# Patient Record
Sex: FEMALE | HISPANIC OR LATINO
[De-identification: names, ages, dates, MRNs, and addresses within clinical notes are randomized per-mention and may not be internally consistent; named-entity substitution may affect disease eponyms.]

---

## 2018-09-05 PROBLEM — Z00.00 ENCOUNTER FOR PREVENTIVE HEALTH EXAMINATION: Status: ACTIVE | Noted: 2018-09-05

## 2018-09-12 ENCOUNTER — RX RENEWAL (OUTPATIENT)
Age: 58
End: 2018-09-12

## 2018-10-05 ENCOUNTER — APPOINTMENT (OUTPATIENT)
Dept: ENDOCRINOLOGY | Facility: CLINIC | Age: 58
End: 2018-10-05

## 2018-10-08 ENCOUNTER — RX RENEWAL (OUTPATIENT)
Age: 58
End: 2018-10-08

## 2018-10-09 ENCOUNTER — RX RENEWAL (OUTPATIENT)
Age: 58
End: 2018-10-09

## 2018-11-19 ENCOUNTER — RX RENEWAL (OUTPATIENT)
Age: 58
End: 2018-11-19

## 2018-12-20 ENCOUNTER — RX RENEWAL (OUTPATIENT)
Age: 58
End: 2018-12-20

## 2019-01-08 ENCOUNTER — RX RENEWAL (OUTPATIENT)
Age: 59
End: 2019-01-08

## 2019-01-11 ENCOUNTER — RECORD ABSTRACTING (OUTPATIENT)
Age: 59
End: 2019-01-11

## 2019-01-11 DIAGNOSIS — Z86.39 PERSONAL HISTORY OF OTHER ENDOCRINE, NUTRITIONAL AND METABOLIC DISEASE: ICD-10-CM

## 2019-01-21 ENCOUNTER — APPOINTMENT (OUTPATIENT)
Dept: ENDOCRINOLOGY | Facility: CLINIC | Age: 59
End: 2019-01-21
Payer: MEDICAID

## 2019-01-21 ENCOUNTER — RX RENEWAL (OUTPATIENT)
Age: 59
End: 2019-01-21

## 2019-01-21 VITALS
WEIGHT: 171 LBS | DIASTOLIC BLOOD PRESSURE: 84 MMHG | SYSTOLIC BLOOD PRESSURE: 122 MMHG | BODY MASS INDEX: 32.28 KG/M2 | HEIGHT: 61 IN | HEART RATE: 95 BPM

## 2019-01-21 DIAGNOSIS — Z63.4 DISAPPEARANCE AND DEATH OF FAMILY MEMBER: ICD-10-CM

## 2019-01-21 DIAGNOSIS — Z83.3 FAMILY HISTORY OF DIABETES MELLITUS: ICD-10-CM

## 2019-01-21 DIAGNOSIS — Z78.9 OTHER SPECIFIED HEALTH STATUS: ICD-10-CM

## 2019-01-21 DIAGNOSIS — Z82.49 FAMILY HISTORY OF ISCHEMIC HEART DISEASE AND OTHER DISEASES OF THE CIRCULATORY SYSTEM: ICD-10-CM

## 2019-01-21 LAB
GLUCOSE BLDC GLUCOMTR-MCNC: 118
GLUCOSE SERPL-MCNC: 69
HBA1C MFR BLD HPLC: 6.9
LDLC SERPL DIRECT ASSAY-MCNC: 94

## 2019-01-21 PROCEDURE — 99214 OFFICE O/P EST MOD 30 MIN: CPT | Mod: 25

## 2019-01-21 PROCEDURE — 82962 GLUCOSE BLOOD TEST: CPT

## 2019-01-21 RX ORDER — DULAGLUTIDE 1.5 MG/.5ML
1.5 INJECTION, SOLUTION SUBCUTANEOUS
Refills: 0 | Status: DISCONTINUED | COMMUNITY
End: 2019-01-21

## 2019-01-21 RX ORDER — DULAGLUTIDE 1.5 MG/.5ML
1.5 INJECTION, SOLUTION SUBCUTANEOUS
Qty: 12 | Refills: 0 | Status: DISCONTINUED | COMMUNITY
Start: 2018-09-12 | End: 2019-01-21

## 2019-01-21 SDOH — SOCIAL STABILITY - SOCIAL INSECURITY: DISSAPEARANCE AND DEATH OF FAMILY MEMBER: Z63.4

## 2019-01-21 NOTE — ASSESSMENT
[FreeTextEntry1] : 58 year old female with type 2 DM with associated neuropathy, HTN, Dyslipidemia and vitamin D insufficiency.  her glycemic control has improved. \par \par 1.  Type 2 DM-  due to abdominal symptoms and improved control, will lower dose of Trulicity to 0.75 mg qweek. \par Ok to proceed with Vocal cord surgery.\par 2.  HTN- well controlled, continue current Rx\par 3.  Dyslipidemia-  immproved, continue to monitor\par 4.  Vitamin D insufficiency-  start vitamin D3 1000 IU daily.

## 2019-01-21 NOTE — REVIEW OF SYSTEMS
[Abdominal Pain] : abdominal pain [Recent Weight Gain (___ Lbs)] : no recent weight gain [Recent Weight Loss (___ Lbs)] : no recent weight loss [Chest Pain] : no chest pain [Shortness Of Breath] : no shortness of breath [Nausea] : no nausea [Polyuria] : no polyuria [Polydipsia] : no polydipsia

## 2019-01-21 NOTE — PHYSICAL EXAM
[No Acute Distress] : no acute distress [Well Nourished] : well nourished [Well Developed] : well developed [Normal Sclera/Conjunctiva] : normal sclera/conjunctiva [No Proptosis] : no proptosis [No Neck Mass] : no neck mass was observed [No LAD] : no lymphadenopathy [Thyroid Not Enlarged] : the thyroid was not enlarged [No Thyroid Nodules] : there were no palpable thyroid nodules [Normal Rate and Effort] : normal respiratory rhythm and effort [Clear to Auscultation] : lungs were clear to auscultation bilaterally [Normal Rate] : heart rate was normal  [Normal S1, S2] : normal S1 and S2 [Regular Rhythm] : with a regular rhythm [Murmurs] : no murmurs [No Edema] : there was no peripheral edema [Acanthosis Nigricans] : acanthosis nigricans [Normal Insight/Judgement] : insight and judgment were intact [Normal Affect] : the affect was normal [Normal Mood] : the mood was normal

## 2019-01-21 NOTE — HISTORY OF PRESENT ILLNESS
[FreeTextEntry1] : Patient is seen today for a routine diabetic follow up.  Also with HTN and hyperlipidemia\par Quality:  Type 2 DM\par Severity:  uncontrolled\par Duration of diabetes:  since 2005\par Associated Complications/ Symptoms:  neuropathy\par Modifying Factors:  Better with medication\par \par Patient tests blood glucose 1 times per day.  \par Reports most fasting BG in the 70- 90 range.  \par \par Current Diabetic Medication Regimen:\par Metformin ER 1000 mg BID\par Glipizide ER 10 mg BID\par Jardiance 25 mg daily (BG was less controlled on Farxiga in the past)\par Trulicity 1.5 mg qweek\par \par Insurance does not cover Sylvie Pro.  \par \par Does have some mild discomfort in abdomen sometimes after taking Trulicity.  no nausea.   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

## 2019-01-21 NOTE — CONSULT LETTER
[Dear  ___] : Dear  [unfilled], [Courtesy Letter:] : I had the pleasure of seeing your patient, [unfilled], in my office today. [Please see my note below.] : Please see my note below. [Consult Closing:] : Thank you very much for allowing me to participate in the care of this patient.  If you have any questions, please do not hesitate to contact me. [Sincerely,] : Sincerely, [FreeTextEntry3] : Isacc Cintron MD, FACE\par

## 2019-03-15 ENCOUNTER — RX RENEWAL (OUTPATIENT)
Age: 59
End: 2019-03-15

## 2019-03-25 ENCOUNTER — MEDICATION RENEWAL (OUTPATIENT)
Age: 59
End: 2019-03-25

## 2019-03-25 ENCOUNTER — RX RENEWAL (OUTPATIENT)
Age: 59
End: 2019-03-25

## 2019-03-25 RX ORDER — METFORMIN HYDROCHLORIDE 1000 MG/1
1000 TABLET, EXTENDED RELEASE ORAL
Qty: 180 | Refills: 0 | Status: DISCONTINUED | COMMUNITY
Start: 2018-11-19 | End: 2019-03-25

## 2019-06-24 ENCOUNTER — APPOINTMENT (OUTPATIENT)
Dept: ENDOCRINOLOGY | Facility: CLINIC | Age: 59
End: 2019-06-24
Payer: MEDICAID

## 2019-06-24 VITALS
HEART RATE: 94 BPM | BODY MASS INDEX: 32.47 KG/M2 | WEIGHT: 172 LBS | DIASTOLIC BLOOD PRESSURE: 82 MMHG | HEIGHT: 61 IN | SYSTOLIC BLOOD PRESSURE: 120 MMHG

## 2019-06-24 LAB
GLUCOSE BLDC GLUCOMTR-MCNC: 119
HBA1C MFR BLD HPLC: 7.1
LDLC SERPL DIRECT ASSAY-MCNC: 95
MICROALBUMIN/CREAT 24H UR-RTO: <18.8

## 2019-06-24 PROCEDURE — 82962 GLUCOSE BLOOD TEST: CPT

## 2019-06-24 PROCEDURE — 99214 OFFICE O/P EST MOD 30 MIN: CPT | Mod: 25

## 2019-06-24 RX ORDER — METFORMIN ER 500 MG 500 MG/1
500 TABLET ORAL
Qty: 360 | Refills: 1 | Status: DISCONTINUED | COMMUNITY
Start: 2019-03-25 | End: 2019-06-24

## 2019-06-24 NOTE — ASSESSMENT
[FreeTextEntry1] : 59 year old female with type 2 DM with associated neuropathy, HTN, Dyslipidemia and vitamin D insufficiency.  Her Glycemic control is reasonable at this time.  Safe to proceed with planned neck surgery from diabetic perspective.  \par \par 1.  Type 2 DM-  continue current Rx.\par 2.  HTN- well controlled, continue current Rx.\par 3.  Dyslipidemia-  improved, continue to monitor.\par 4.  Vitamin D insufficiency-  improved, continue vitamin D3 1000 IU daily

## 2019-06-24 NOTE — CONSULT LETTER
[Dear  ___] : Dear ~KEON, [Courtesy Letter:] : I had the pleasure of seeing your patient, [unfilled], in my office today. [Please see my note below.] : Please see my note below. [Consult Closing:] : Thank you very much for allowing me to participate in the care of this patient.  If you have any questions, please do not hesitate to contact me. [Sincerely,] : Sincerely, [FreeTextEntry3] : Isacc Cintron MD, FACE\par

## 2019-06-24 NOTE — REVIEW OF SYSTEMS
[Back Pain] : back pain [Recent Weight Gain (___ Lbs)] : no recent weight gain [Chest Pain] : no chest pain [Shortness Of Breath] : no shortness of breath [Nausea] : no nausea [Abdominal Pain] : no abdominal pain [Polyuria] : no polyuria

## 2019-06-24 NOTE — HISTORY OF PRESENT ILLNESS
[FreeTextEntry1] : Patient is seen today for a routine diabetic follow up.  Also with HTN and hyperlipidemia\par Quality:  Type 2 DM\par Severity:  uncontrolled\par Duration of diabetes:  since 2005\par Associated Complications/ Symptoms:  neuropathy\par Modifying Factors:  Better with medication\par \par Patient tests blood glucose 1 times per day.   Reports that fasting BG in the 90- 120 mg/dl range with no recent hypoglycemia.  \par \par Current Diabetic Medication Regimen:\par Metformin 1000 mg BID\par Glipizide ER 10 mg BID\par Jardiance 25 mg daily (BG was less controlled on Farxiga in the past)\par Trulicity 0.75 mg qweek  (dose lowered last visit due to abdominal discomfort after injection)\par \par Insurance does not cover Sylvie Pro.  \par Going ofr neck surgery in July and needs clearance.  \par

## 2019-06-24 NOTE — DATA REVIEWED
[FreeTextEntry1] : LABS:\par 6/20/2019:\par A1c 7.1%\par LDL 95\par Urine microalbumin/ Creatinine:  < 18.8\par 25(OH)D  59

## 2019-07-25 ENCOUNTER — APPOINTMENT (OUTPATIENT)
Dept: ENDOCRINOLOGY | Facility: CLINIC | Age: 59
End: 2019-07-25

## 2019-07-30 ENCOUNTER — APPOINTMENT (OUTPATIENT)
Dept: ENDOCRINOLOGY | Facility: CLINIC | Age: 59
End: 2019-07-30
Payer: MEDICAID

## 2019-07-30 VITALS
OXYGEN SATURATION: 93 % | SYSTOLIC BLOOD PRESSURE: 118 MMHG | WEIGHT: 175 LBS | HEART RATE: 105 BPM | HEIGHT: 61 IN | BODY MASS INDEX: 33.04 KG/M2 | DIASTOLIC BLOOD PRESSURE: 76 MMHG

## 2019-07-30 LAB — GLUCOSE BLDC GLUCOMTR-MCNC: 157

## 2019-07-30 PROCEDURE — 82962 GLUCOSE BLOOD TEST: CPT

## 2019-07-30 PROCEDURE — 99214 OFFICE O/P EST MOD 30 MIN: CPT | Mod: 25

## 2019-07-30 RX ORDER — FAMOTIDINE 20 MG/1
20 TABLET, FILM COATED ORAL DAILY
Qty: 90 | Refills: 0 | Status: DISCONTINUED | COMMUNITY
End: 2019-07-30

## 2019-07-30 RX ORDER — GABAPENTIN 300 MG/1
300 CAPSULE ORAL
Qty: 30 | Refills: 0 | Status: DISCONTINUED | COMMUNITY
Start: 2019-03-04 | End: 2019-07-30

## 2019-07-30 NOTE — HISTORY OF PRESENT ILLNESS
[FreeTextEntry1] : Patient is seen today for a routine diabetic follow up.  Also with HTN and hyperlipidemia\par Quality:  Type 2 DM\par Severity:  uncontrolled\par Duration of diabetes:  since 2005\par Associated Complications/ Symptoms:  neuropathy\par Modifying Factors:  Better with medication\par \par Patient tests blood glucose 1-2 times per day.     Insurance does not cover Sylvie Pro.   Reports that BG has been well controlled with moist values in the in the low 100s.  No recent hypoglycemia.  \par \par Current Diabetic Medication Regimen:\par Metformin 1000 mg BID\par Glipizide ER 10 mg BID\par Jardiance 25 mg daily (BG was less controlled on Farxiga in the past)\par Trulicity 0.75 mg qweek  (dose lowered last visit due to abdominal discomfort after injection)\par \par On 8/6/2019 having anterior cervical discectomy with fusion.\par C/o neck pain.

## 2019-07-30 NOTE — ASSESSMENT
[FreeTextEntry1] : 59 year old female with type 2 DM with associated neuropathy, HTN, Dyslipidemia and vitamin D insufficiency.  Her Glycemic control is reasonable at this time.  Safe to proceed with planned cervical fusion from diabetic perspective.  \par \par 1.  Type 2 DM-  continue current Rx, A1c has improved.  \par 2.  HTN- well controlled, continue current Rx.\par 3.  Dyslipidemia-  improved, continue to monitor.\par 4.  Vitamin D insufficiency-   continue vitamin D3 1000 IU daily  No

## 2019-07-30 NOTE — PHYSICAL EXAM
[Well Nourished] : well nourished [No Acute Distress] : no acute distress [Normal Sclera/Conjunctiva] : normal sclera/conjunctiva [Well Developed] : well developed [No Proptosis] : no proptosis [No Neck Mass] : no neck mass was observed [No LAD] : no lymphadenopathy [Thyroid Not Enlarged] : the thyroid was not enlarged [No Thyroid Nodules] : there were no palpable thyroid nodules [No Respiratory Distress] : no respiratory distress [Clear to Auscultation] : lungs were clear to auscultation bilaterally [Normal Rate] : heart rate was normal  [Normal S1, S2] : normal S1 and S2 [Regular Rhythm] : with a regular rhythm [Murmurs] : no murmurs [No Edema] : there was no peripheral edema [Acanthosis Nigricans] : no acanthosis nigricans [Normal Insight/Judgement] : insight and judgment were intact [Normal Affect] : the affect was normal [Normal Mood] : the mood was normal

## 2019-07-30 NOTE — REVIEW OF SYSTEMS
[Recent Weight Gain (___ Lbs)] : no recent weight gain [Chest Pain] : no chest pain [Recent Weight Loss (___ Lbs)] : no recent weight loss [Back Pain] : back pain [Nausea] : no nausea [Shortness Of Breath] : no shortness of breath [Pain/Numbness of Digits] : no pain/numbness of digits

## 2019-09-13 ENCOUNTER — RX RENEWAL (OUTPATIENT)
Age: 59
End: 2019-09-13

## 2019-10-09 ENCOUNTER — RX RENEWAL (OUTPATIENT)
Age: 59
End: 2019-10-09

## 2019-11-11 LAB
HBA1C MFR BLD HPLC: 6.8
LDLC SERPL DIRECT ASSAY-MCNC: 115

## 2019-11-12 ENCOUNTER — APPOINTMENT (OUTPATIENT)
Dept: ENDOCRINOLOGY | Facility: CLINIC | Age: 59
End: 2019-11-12
Payer: MEDICAID

## 2019-11-12 VITALS
SYSTOLIC BLOOD PRESSURE: 108 MMHG | HEIGHT: 61 IN | DIASTOLIC BLOOD PRESSURE: 74 MMHG | OXYGEN SATURATION: 96 % | HEART RATE: 107 BPM | WEIGHT: 173 LBS | BODY MASS INDEX: 32.66 KG/M2

## 2019-11-12 DIAGNOSIS — E55.9 VITAMIN D DEFICIENCY, UNSPECIFIED: ICD-10-CM

## 2019-11-12 DIAGNOSIS — M50.10 CERVICAL DISC DISORDER WITH RADICULOPATHY, UNSPECIFIED CERVICAL REGION: ICD-10-CM

## 2019-11-12 LAB — GLUCOSE BLDC GLUCOMTR-MCNC: 149

## 2019-11-12 PROCEDURE — 99214 OFFICE O/P EST MOD 30 MIN: CPT | Mod: 25

## 2019-11-12 PROCEDURE — 82962 GLUCOSE BLOOD TEST: CPT

## 2019-11-12 NOTE — PHYSICAL EXAM
[No Acute Distress] : no acute distress [Well Nourished] : well nourished [Well Developed] : well developed [No Proptosis] : no proptosis [Normal Sclera/Conjunctiva] : normal sclera/conjunctiva [No LAD] : no lymphadenopathy [No Neck Mass] : no neck mass was observed [Thyroid Not Enlarged] : the thyroid was not enlarged [No Respiratory Distress] : no respiratory distress [Clear to Auscultation] : lungs were clear to auscultation bilaterally [No Thyroid Nodules] : there were no palpable thyroid nodules [Normal Rate] : heart rate was normal  [Normal S1, S2] : normal S1 and S2 [Regular Rhythm] : with a regular rhythm [Murmurs] : no murmurs [No Edema] : there was no peripheral edema [Right Foot Was Examined] : right foot ~C was examined [Left Foot Was Examined] : left foot ~C was examined [Normal] : normal [2+] : 2+ in the dorsalis pedis [Normal Insight/Judgement] : insight and judgment were intact [Normal Affect] : the affect was normal [Normal Mood] : the mood was normal [Acanthosis Nigricans] : no acanthosis nigricans [Diminished Throughout Both Feet] : normal tactile sensation with monofilament testing throughout both feet

## 2019-11-12 NOTE — ASSESSMENT
[FreeTextEntry1] : 59 year old female with type 2 DM with associated neuropathy, HTN, Dyslipidemia and vitamin D insufficiency.  Her glycemic control is excellent.  \par \par 1.  Type 2 DM-  continue current Rx .  \par 2.  HTN- well controlled on current Rx.\par 3.  Dyslipidemia- Would benefit from addition of statin.   Will start Atorvastatin 10 mg daily.  \par 4.  Vitamin D insufficiency-   continue vitamin D3 1000 IU daily

## 2019-11-12 NOTE — HISTORY OF PRESENT ILLNESS
[FreeTextEntry1] : Patient is seen today for a routine diabetic follow up.  Also with HTN and hyperlipidemia\par Quality:  Type 2 DM\par Severity:  uncontrolled\par Duration of diabetes:  since 2005\par Associated Complications/ Symptoms:  neuropathy\par Modifying Factors:  Better with medication\par \par Patient tests blood glucose 1-2 times per day.     Insurance does not cover Sylvie Pro.   \par Most BG in the 70- 140 mg/dl range when fasting.  No recent hypoglycemia.  \par \par Current Diabetic Medication Regimen:\par Metformin 1000 mg BID\par Glipizide ER 10 mg BID\par Jardiance 25 mg daily (BG was less controlled on Farxiga in the past)\par Trulicity 0.75 mg qweek  (dose lowered last visit due to abdominal discomfort after injection)\par \par

## 2020-03-03 ENCOUNTER — RX RENEWAL (OUTPATIENT)
Age: 60
End: 2020-03-03

## 2020-04-27 ENCOUNTER — APPOINTMENT (OUTPATIENT)
Dept: ENDOCRINOLOGY | Facility: CLINIC | Age: 60
End: 2020-04-27

## 2020-05-06 ENCOUNTER — RX RENEWAL (OUTPATIENT)
Age: 60
End: 2020-05-06

## 2020-05-07 ENCOUNTER — APPOINTMENT (OUTPATIENT)
Dept: ENDOCRINOLOGY | Facility: CLINIC | Age: 60
End: 2020-05-07

## 2020-05-20 ENCOUNTER — RX RENEWAL (OUTPATIENT)
Age: 60
End: 2020-05-20

## 2020-09-08 ENCOUNTER — APPOINTMENT (OUTPATIENT)
Dept: ENDOCRINOLOGY | Facility: CLINIC | Age: 60
End: 2020-09-08
Payer: MEDICAID

## 2020-09-08 VITALS
DIASTOLIC BLOOD PRESSURE: 78 MMHG | OXYGEN SATURATION: 93 % | HEIGHT: 60 IN | HEART RATE: 110 BPM | BODY MASS INDEX: 34.16 KG/M2 | SYSTOLIC BLOOD PRESSURE: 108 MMHG | WEIGHT: 174 LBS

## 2020-09-08 DIAGNOSIS — Z87.19 PERSONAL HISTORY OF OTHER DISEASES OF THE DIGESTIVE SYSTEM: ICD-10-CM

## 2020-09-08 LAB — GLUCOSE BLDC GLUCOMTR-MCNC: 145

## 2020-09-08 PROCEDURE — 82962 GLUCOSE BLOOD TEST: CPT

## 2020-09-08 PROCEDURE — 99214 OFFICE O/P EST MOD 30 MIN: CPT | Mod: 25

## 2020-09-08 RX ORDER — OMEPRAZOLE 20 MG/1
20 CAPSULE, DELAYED RELEASE ORAL
Qty: 30 | Refills: 0 | Status: DISCONTINUED | COMMUNITY
Start: 2019-01-16 | End: 2020-09-08

## 2020-09-08 NOTE — HISTORY OF PRESENT ILLNESS
[FreeTextEntry1] : Patient is seen today for a routine diabetic follow up.  Also with HTN and hyperlipidemia\par Quality:  Type 2 DM\par Severity:  uncontrolled\par Duration of diabetes:  since 2005\par Associated Complications/ Symptoms:  neuropathy\par Modifying Factors:  Better with medication\par \par Patient tests blood glucose 1-2 times per day.     Insurance does not cover Sylvie Pro.   \par \par Current Diabetic Medication Regimen:\par Metformin 1000 mg BID\par Glipizide ER 10 mg BID\par Jardiance 25 mg daily (BG was less controlled on Farxiga in the past)\par Trulicity 0.75 mg qweek  (dose lowered last visit due to abdominal discomfort after injection)\par \par Requesting refill on omeprazole.  If she does not take this she reports GERD.  No recent abdominal pain or nausea.  \par

## 2020-09-08 NOTE — ASSESSMENT
[FreeTextEntry1] : 60 year old female with type 2 DM with associated neuropathy, HTN, Dyslipidemia and vitamin D insufficiency.  Her glycemic control is excellent.  \par \par 1.  Type 2 DM-  continue current Rx .  \par 2.  HTN-  continue Losartan\par 3.  Dyslipidemia- not taking statin due to elevated LFTs (likely secondary to NAFLD).  Continue to monitor.  \par 4.  GERD-  will give one time refill on omeprazole until she can follow up with PCP for further refills.

## 2020-09-08 NOTE — PHYSICAL EXAM
[Healthy Appearance] : healthy appearance [Normal Sclera/Conjunctiva] : normal sclera/conjunctiva [No Acute Distress] : no acute distress [No Proptosis] : no proptosis [No Neck Mass] : no neck mass was observed [Supple] : the neck was supple [No LAD] : no lymphadenopathy [Thyroid Not Enlarged] : the thyroid was not enlarged [Clear to Auscultation] : lungs were clear to auscultation bilaterally [No Respiratory Distress] : no respiratory distress [No Thyroid Nodules] : no palpable thyroid nodules [Normal S1, S2] : normal S1 and S2 [Normal Rate] : heart rate was normal [Regular Rhythm] : with a regular rhythm [No Murmurs] : no murmurs [Normal Affect] : the affect was normal [Normal Insight/Judgement] : insight and judgment were intact [Normal Mood] : the mood was normal [Acanthosis Nigricans] : no acanthosis nigricans

## 2020-09-08 NOTE — DATA REVIEWED
[FreeTextEntry1] : LABS:\par 9/3/2020:\par A1c 6.9%\par AST  46\par ALT 70\par Alb/ Creatinine:  <6\par \par

## 2020-09-08 NOTE — REVIEW OF SYSTEMS
[Heartburn] : heartburn [Chest Pain] : no chest pain [Shortness Of Breath] : no shortness of breath [Nausea] : no nausea

## 2020-09-08 NOTE — CONSULT LETTER
[Dear  ___] : Dear  [unfilled], [Courtesy Letter:] : I had the pleasure of seeing your patient, [unfilled], in my office today. [Consult Closing:] : Thank you very much for allowing me to participate in the care of this patient.  If you have any questions, please do not hesitate to contact me. [Sincerely,] : Sincerely, [FreeTextEntry3] : Isacc Cintron MD, FACE\par

## 2020-10-14 ENCOUNTER — RX RENEWAL (OUTPATIENT)
Age: 60
End: 2020-10-14

## 2020-12-01 ENCOUNTER — RX RENEWAL (OUTPATIENT)
Age: 60
End: 2020-12-01

## 2021-01-04 ENCOUNTER — RX RENEWAL (OUTPATIENT)
Age: 61
End: 2021-01-04

## 2021-01-11 ENCOUNTER — APPOINTMENT (OUTPATIENT)
Dept: ENDOCRINOLOGY | Facility: CLINIC | Age: 61
End: 2021-01-11

## 2021-01-18 ENCOUNTER — RX RENEWAL (OUTPATIENT)
Age: 61
End: 2021-01-18

## 2021-01-29 ENCOUNTER — RX RENEWAL (OUTPATIENT)
Age: 61
End: 2021-01-29

## 2021-02-17 ENCOUNTER — RX RENEWAL (OUTPATIENT)
Age: 61
End: 2021-02-17

## 2021-02-24 ENCOUNTER — RX RENEWAL (OUTPATIENT)
Age: 61
End: 2021-02-24

## 2021-03-01 ENCOUNTER — RX RENEWAL (OUTPATIENT)
Age: 61
End: 2021-03-01

## 2021-09-10 LAB
HBA1C MFR BLD HPLC: 7.6
LDLC SERPL DIRECT ASSAY-MCNC: 116

## 2021-09-13 ENCOUNTER — APPOINTMENT (OUTPATIENT)
Dept: ENDOCRINOLOGY | Facility: CLINIC | Age: 61
End: 2021-09-13
Payer: MEDICAID

## 2021-09-13 VITALS
WEIGHT: 176 LBS | HEIGHT: 60 IN | SYSTOLIC BLOOD PRESSURE: 110 MMHG | HEART RATE: 108 BPM | DIASTOLIC BLOOD PRESSURE: 78 MMHG | BODY MASS INDEX: 34.55 KG/M2 | OXYGEN SATURATION: 97 %

## 2021-09-13 LAB — GLUCOSE BLDC GLUCOMTR-MCNC: 136

## 2021-09-13 PROCEDURE — 99214 OFFICE O/P EST MOD 30 MIN: CPT | Mod: 25

## 2021-09-13 PROCEDURE — 82962 GLUCOSE BLOOD TEST: CPT

## 2021-09-13 RX ORDER — ATORVASTATIN CALCIUM 10 MG/1
10 TABLET, FILM COATED ORAL
Qty: 90 | Refills: 0 | Status: DISCONTINUED | COMMUNITY
Start: 2019-11-12 | End: 2021-09-13

## 2021-09-13 NOTE — HISTORY OF PRESENT ILLNESS
[FreeTextEntry1] : Follow up T2 DM, HTN and hyperlipidemia\par \par Quality:  Type 2 DM\par Severity:  uncontrolled\par Duration of diabetes:  since 2005\par Associated Complications/ Symptoms:  neuropathy\par Modifying Factors:  Better with medication\par \par Patient tests blood glucose 1-2 times per day.   Most BG in the 100- 150 mg/dl range.    \par Insurance does not cover Sylvie Pro.   \par \par Current Diabetic Medication Regimen:\par Metformin 1000 mg BID\par Glipizide ER 10 mg BID\par Jardiance 25 mg daily (BG was less controlled on Farxiga in the past)\par Trulicity 0.75 mg qweek  (dose lowered last visit due to abdominal discomfort after injection)\par  \par Complains of insomnia.  Minimal improvement with sleeping aids.  \par Has not been taking Atorvastatin due to elevated LFTs (PCP told to stop taking).  \par Complains of cramping and pain in her feet.

## 2021-09-13 NOTE — REASON FOR VISIT
[Follow - Up] : a follow-up visit [DM Type 2] : DM Type 2 [[employer] ] : provided by [employer]   [TWNoteComboBox1] : [place]

## 2021-09-13 NOTE — PHYSICAL EXAM
[Healthy Appearance] : healthy appearance [No Acute Distress] : no acute distress [Normal Sclera/Conjunctiva] : normal sclera/conjunctiva [No Proptosis] : no proptosis [No Respiratory Distress] : no respiratory distress [Clear to Auscultation] : lungs were clear to auscultation bilaterally [Normal S1, S2] : normal S1 and S2 [No Murmurs] : no murmurs [Normal Rate] : heart rate was normal [Regular Rhythm] : with a regular rhythm [Right Foot Was Examined] : right foot ~C was examined [Left Foot Was Examined] : left foot ~C was examined [Normal] : normal [2+] : 2+ in the dorsalis pedis [Normal Affect] : the affect was normal [Normal Insight/Judgement] : insight and judgment were intact [Normal Mood] : the mood was normal [Acanthosis Nigricans] : no acanthosis nigricans [Diminished Throughout Both Feet] : normal tactile sensation with monofilament testing throughout both feet

## 2021-09-13 NOTE — ASSESSMENT
[FreeTextEntry1] : 61 year old female with type 2 DM with associated neuropathy, HTN, Dyslipidemia and vitamin D insufficiency.  Her diabetes control has worsened slightly.  \par \par 1.  Type 2 DM-  continue current medications, but advised diet modification to reduce A1c to goal of  <7%..   She is complaining of some pain and cramping in her feet and legs with difficulty sleeping, which could be related to diabetic neuropathy.  Will prescribe gabapentin 300 mg qhs as a trial.  \par 2.  HTN- continue ARB.\par 3.  Dyslipidemia-  Statin D/C'ed due to elevated LFTs.  continue to monitor.   \par

## 2021-09-13 NOTE — DATA REVIEWED
[FreeTextEntry1] : LABS:\par 9/3/2020:\par A1c 6.9%\par AST  46\par ALT 70\par Alb/ Creatinine:  <6\par \par  Statement Selected EOAE (evoked otoacoustic emission)

## 2021-09-13 NOTE — REVIEW OF SYSTEMS
[Fatigue] : fatigue [Pain/Numbness of Digits] : pain/numbness of digits [Insomnia] : insomnia [Chest Pain] : no chest pain [Shortness Of Breath] : no shortness of breath [Nausea] : no nausea [Abdominal Pain] : no abdominal pain

## 2022-01-14 ENCOUNTER — RX RENEWAL (OUTPATIENT)
Age: 62
End: 2022-01-14

## 2022-01-17 LAB
HBA1C MFR BLD HPLC: 7.2
LDLC SERPL DIRECT ASSAY-MCNC: 102
MICROALBUMIN/CREAT 24H UR-RTO: <8

## 2022-01-18 ENCOUNTER — APPOINTMENT (OUTPATIENT)
Dept: ENDOCRINOLOGY | Facility: CLINIC | Age: 62
End: 2022-01-18
Payer: MEDICAID

## 2022-01-18 ENCOUNTER — RX RENEWAL (OUTPATIENT)
Age: 62
End: 2022-01-18

## 2022-01-18 VITALS
HEIGHT: 60 IN | HEART RATE: 96 BPM | OXYGEN SATURATION: 98 % | WEIGHT: 178 LBS | SYSTOLIC BLOOD PRESSURE: 146 MMHG | BODY MASS INDEX: 34.95 KG/M2 | DIASTOLIC BLOOD PRESSURE: 78 MMHG

## 2022-01-18 LAB — GLUCOSE BLDC GLUCOMTR-MCNC: 228

## 2022-01-18 PROCEDURE — 99214 OFFICE O/P EST MOD 30 MIN: CPT | Mod: 25

## 2022-01-18 PROCEDURE — 82962 GLUCOSE BLOOD TEST: CPT

## 2022-01-18 RX ORDER — DULAGLUTIDE 0.75 MG/.5ML
0.75 INJECTION, SOLUTION SUBCUTANEOUS
Qty: 6 | Refills: 1 | Status: DISCONTINUED | COMMUNITY
Start: 2019-01-21 | End: 2022-01-18

## 2022-01-18 RX ORDER — OMEPRAZOLE 40 MG/1
40 CAPSULE, DELAYED RELEASE ORAL
Qty: 90 | Refills: 0 | Status: DISCONTINUED | COMMUNITY
Start: 2020-09-08 | End: 2022-01-18

## 2022-01-18 NOTE — REVIEW OF SYSTEMS
[Heartburn] : heartburn [Pain/Numbness of Digits] : pain/numbness of digits [Recent Weight Gain (___ Lbs)] : no recent weight gain [Recent Weight Loss (___ Lbs)] : no recent weight loss [Chest Pain] : no chest pain [Shortness Of Breath] : no shortness of breath [Nausea] : no nausea

## 2022-01-18 NOTE — HISTORY OF PRESENT ILLNESS
[FreeTextEntry1] : Follow up T2 DM, HTN and hyperlipidemia\par \par Quality:  Type 2 DM\par Severity:  uncontrolled\par Duration of diabetes:  since 2005\par Associated Complications/ Symptoms:  neuropathy (better with gabapentin)\par Modifying Factors:  Better with medication\par \par Patient tests blood glucose 1-2 times per day.    Most fasting BG in the 80- 130 mg/dl range.  \par Insurance does not cover Sylvie Pro.   \par \par Current Diabetic Medication Regimen:\par Metformin 1000 mg BID\par Glipizide ER 10 mg BID\par Jardiance 25 mg daily (BG was less controlled on Farxiga in the past)\par Trulicity 0.75 mg qweek  (dose lowered due to abdominal discomfort after injection)\par \par Atorvastatin D/C'ed due to elevated LFTs \par Complains of increased appetite.

## 2022-01-18 NOTE — ASSESSMENT
[FreeTextEntry1] : 61 year old female with type 2 DM with associated neuropathy, HTN, Dyslipidemia and vitamin D insufficiency.  Her diabetes control is improving.   \par \par 1.  Type 2 DM-   Will try increasing Trulicity back to 1.5 mg weekly to see if better tolerated to help suppress appetite and decrease A1c.   Continue Gabapentin for neuropathy\par 2.  HTN- continue ARB.\par 3.  Dyslipidemia-  Statin D/C'ed due to elevated LFTs.  continue to monitor.   \par

## 2022-05-18 ENCOUNTER — RX RENEWAL (OUTPATIENT)
Age: 62
End: 2022-05-18

## 2022-05-20 LAB
HBA1C MFR BLD HPLC: 7.8
LDLC SERPL DIRECT ASSAY-MCNC: 89
MICROALBUMIN/CREAT 24H UR-RTO: <10

## 2022-05-24 ENCOUNTER — APPOINTMENT (OUTPATIENT)
Dept: ENDOCRINOLOGY | Facility: CLINIC | Age: 62
End: 2022-05-24
Payer: MEDICAID

## 2022-05-24 VITALS
HEART RATE: 97 BPM | OXYGEN SATURATION: 99 % | HEIGHT: 60 IN | SYSTOLIC BLOOD PRESSURE: 116 MMHG | DIASTOLIC BLOOD PRESSURE: 80 MMHG | BODY MASS INDEX: 33.96 KG/M2 | WEIGHT: 173 LBS

## 2022-05-24 DIAGNOSIS — K76.0 FATTY (CHANGE OF) LIVER, NOT ELSEWHERE CLASSIFIED: ICD-10-CM

## 2022-05-24 LAB — GLUCOSE BLDC GLUCOMTR-MCNC: 239

## 2022-05-24 PROCEDURE — 82962 GLUCOSE BLOOD TEST: CPT

## 2022-05-24 PROCEDURE — 99214 OFFICE O/P EST MOD 30 MIN: CPT | Mod: 25

## 2022-05-24 RX ORDER — DULAGLUTIDE 1.5 MG/.5ML
1.5 INJECTION, SOLUTION SUBCUTANEOUS
Qty: 1 | Refills: 3 | Status: DISCONTINUED | COMMUNITY
Start: 2022-01-18 | End: 2022-05-24

## 2022-05-24 NOTE — ASSESSMENT
[FreeTextEntry1] : 62 year old female with type 2 DM with associated neuropathy, HTN, Dyslipidemia and vitamin D insufficiency.  Her diabetes control has worsened, likely related to PP hyperglycemia.   She also has elevated LFTs, likely related to NAFLD. \par \par 1.  Type 2 DM-    Try increasing Trulicity to 3 mg qweek.  Advised need to to limit postprandial hyperglycemia.  Continue Gabapentin for neuropathy.  Repeat A1c in 3-4 months.   \par 2.  HTN- continue ARB.\par 3.  Dyslipidemia-  Statin D/C'ed due to elevated LFTs.   LDL is currently reasonable.  Continue to monitor.  \par \par Follow up in 4 months with repeat labs before that visit.  \par

## 2022-05-24 NOTE — PHYSICAL EXAM
[Healthy Appearance] : healthy appearance [No Acute Distress] : no acute distress [Normal Sclera/Conjunctiva] : normal sclera/conjunctiva [No Proptosis] : no proptosis [No Neck Mass] : no neck mass was observed [No LAD] : no lymphadenopathy [Supple] : the neck was supple [Thyroid Not Enlarged] : the thyroid was not enlarged [No Thyroid Nodules] : no palpable thyroid nodules [No Respiratory Distress] : no respiratory distress [Clear to Auscultation] : lungs were clear to auscultation bilaterally [Normal S1, S2] : normal S1 and S2 [No Murmurs] : no murmurs [Normal Rate] : heart rate was normal [Regular Rhythm] : with a regular rhythm [Acanthosis Nigricans] : acanthosis nigricans present [Normal Affect] : the affect was normal [Normal Insight/Judgement] : insight and judgment were intact [Normal Mood] : the mood was normal

## 2022-05-24 NOTE — HISTORY OF PRESENT ILLNESS
[FreeTextEntry1] : Follow up T2 DM, HTN and hyperlipidemia\par \par Quality:  Type 2 DM\par Severity:  uncontrolled\par Duration of diabetes:  since 2005\par Associated Complications/ Symptoms:  neuropathy (better with gabapentin)\par Modifying Factors:  Better with medication\par \par Patient tests blood glucose 1 time a day.  Most fasting BG in the 90-  low 100 mg/dl range.  \par Insurance does not cover Sylvie Pro.   \par \par Current Diabetic Medication Regimen:\par Metformin 1000 mg BID\par Glipizide ER 10 mg BID\par Jardiance 25 mg daily (BG was less controlled on Farxiga in the past)\par Trulicity  1.5 mg qweek  (increased last visit).  Now tolerating well without abdominal pain or nausea.     \par \par Atorvastatin D/C'ed due to elevated LFTs \par

## 2022-05-24 NOTE — REASON FOR VISIT
[Follow - Up] : a follow-up visit [DM Type 2] : DM Type 2 [[employer] ] : provided by [employer]   [Interpreters_IDNumber] : [id number] [Interpreters_FullName] : Marilyn Mata

## 2022-09-30 ENCOUNTER — RX RENEWAL (OUTPATIENT)
Age: 62
End: 2022-09-30

## 2022-10-04 LAB
HBA1C MFR BLD HPLC: 7.5
LDLC SERPL DIRECT ASSAY-MCNC: 106
MICROALBUMIN/CREAT 24H UR-RTO: <16

## 2022-10-05 ENCOUNTER — APPOINTMENT (OUTPATIENT)
Dept: ENDOCRINOLOGY | Facility: CLINIC | Age: 62
End: 2022-10-05

## 2022-10-05 VITALS
BODY MASS INDEX: 34.16 KG/M2 | HEIGHT: 60 IN | SYSTOLIC BLOOD PRESSURE: 140 MMHG | WEIGHT: 174 LBS | HEART RATE: 118 BPM | DIASTOLIC BLOOD PRESSURE: 80 MMHG | OXYGEN SATURATION: 98 %

## 2022-10-05 LAB — GLUCOSE BLDC GLUCOMTR-MCNC: 161

## 2022-10-05 PROCEDURE — 99214 OFFICE O/P EST MOD 30 MIN: CPT | Mod: 25

## 2022-10-05 PROCEDURE — 82962 GLUCOSE BLOOD TEST: CPT

## 2022-10-05 RX ORDER — LANCETS
EACH MISCELLANEOUS
Qty: 200 | Refills: 1 | Status: ACTIVE | COMMUNITY
Start: 1900-01-01 | End: 1900-01-01

## 2022-10-05 RX ORDER — BLOOD SUGAR DIAGNOSTIC
STRIP MISCELLANEOUS
Qty: 2 | Refills: 1 | Status: ACTIVE | COMMUNITY
Start: 1900-01-01 | End: 1900-01-01

## 2022-10-05 NOTE — ASSESSMENT
[FreeTextEntry1] : 62 year old female with type 2 DM with associated neuropathy, HTN, Dyslipidemia and vitamin D insufficiency.   Her diabetes control is improving, but still above goal.   Her LFTs are improving with better glycemic control, likely indicating improved NAFLD.  \par \par 1.  Type 2 DM-    Continue current dose of Metformin, Jardiance, Trulicity and Glipizide.  Advised further diet modification, exercise and weight loss. Continue Gabapentin for neuropathy.  Repeat A1c in 3-4 months.   \par 2.  HTN- continue ARB.\par 3.  Dyslipidemia-  Statin  held due to elevated LFTs.   Continue to monitor.  \par \par Follow up in 4 months.\par

## 2022-10-05 NOTE — HISTORY OF PRESENT ILLNESS
[FreeTextEntry1] : Follow up T2 DM, HTN and hyperlipidemia\par \par Quality:  Type 2 DM\par Severity:  uncontrolled\par Duration of diabetes:  since 2005\par Associated Complications/ Symptoms:  neuropathy (better with gabapentin)\par Modifying Factors:  Better with medication\par \par Patient tests blood glucose 2 times a day.   Reports only one episodes of BG over 200 mg/dl lately.  No symptoms of hypoglycemia.  \par \par Current Diabetic Medication Regimen:\par Metformin 1000 mg BID\par Glipizide ER 10 mg BID\par Jardiance 25 mg daily (BG was less controlled on Farxiga in the past)\par Trulicity  3 mg qweek (increased last visit)\par \par Atorvastatin D/C'ed due to elevated LFTs \par \par Over the past week has developed tinnitus and vertigo and seeing PCP for this. \par

## 2022-10-05 NOTE — REASON FOR VISIT
[Follow - Up] : a follow-up visit [DM Type 2] : DM Type 2 [[employer] ] : provided by [employer]   [Interpreters_IDNumber] : [id number] [Interpreters_FullName] : Gaye

## 2022-10-05 NOTE — REVIEW OF SYSTEMS
[Dizziness] : dizziness [Recent Weight Gain (___ Lbs)] : no recent weight gain [Recent Weight Loss (___ Lbs)] : no recent weight loss [Nausea] : no nausea

## 2022-10-10 ENCOUNTER — RX RENEWAL (OUTPATIENT)
Age: 62
End: 2022-10-10

## 2022-10-11 ENCOUNTER — RX RENEWAL (OUTPATIENT)
Age: 62
End: 2022-10-11

## 2022-11-03 ENCOUNTER — RX RENEWAL (OUTPATIENT)
Age: 62
End: 2022-11-03

## 2022-12-02 ENCOUNTER — RX RENEWAL (OUTPATIENT)
Age: 62
End: 2022-12-02

## 2023-02-13 ENCOUNTER — RX RENEWAL (OUTPATIENT)
Age: 63
End: 2023-02-13

## 2023-02-13 ENCOUNTER — APPOINTMENT (OUTPATIENT)
Dept: ENDOCRINOLOGY | Facility: CLINIC | Age: 63
End: 2023-02-13
Payer: MEDICAID

## 2023-02-13 ENCOUNTER — RESULT CHARGE (OUTPATIENT)
Age: 63
End: 2023-02-13

## 2023-02-13 VITALS
HEART RATE: 109 BPM | SYSTOLIC BLOOD PRESSURE: 110 MMHG | HEIGHT: 60 IN | DIASTOLIC BLOOD PRESSURE: 70 MMHG | BODY MASS INDEX: 33.18 KG/M2 | WEIGHT: 169 LBS | OXYGEN SATURATION: 97 %

## 2023-02-13 LAB — GLUCOSE BLDC GLUCOMTR-MCNC: 172

## 2023-02-13 PROCEDURE — 82962 GLUCOSE BLOOD TEST: CPT

## 2023-02-13 PROCEDURE — 99214 OFFICE O/P EST MOD 30 MIN: CPT | Mod: 25

## 2023-02-13 NOTE — REVIEW OF SYSTEMS
[Recent Weight Loss (___ Lbs)] : recent weight loss: [unfilled] lbs [Pain/Numbness of Digits] : pain/numbness of digits

## 2023-02-13 NOTE — HISTORY OF PRESENT ILLNESS
[FreeTextEntry1] : Follow up T2 DM, HTN and hyperlipidemia\par \par Quality:  Type 2 DM\par Severity:  uncontrolled\par Duration of diabetes:  since 2005\par Associated Complications/ Symptoms:  neuropathy (better with gabapentin)\par Modifying Factors:  Better with medication\par \par Patient tests blood glucose 2 times a day.   \par \par Current Diabetic Medication Regimen:\par Metformin 1000 mg BID\par Glipizide ER 10 mg BID\par Jardiance 25 mg daily (BG was less controlled on Farxiga in the past)\par Trulicity  3 mg qweek\par \par Atorvastatin D/C'ed due to elevated LFTs \par \par Has lost a few pounds since last visit related to diet changes. \par

## 2023-02-13 NOTE — ASSESSMENT
[FreeTextEntry1] : 62 year old female with type 2 DM with associated neuropathy, HTN, Dyslipidemia, NAFLD and vitamin D insufficiency here for follow up.    Unable to assess glycemic control without SMBG data or labs.   \par \par 1.  Type 2 DM-    Check labs now.   Continue Metformin, Jardiance, Trulicity and Glipizide. Continue Gabapentin for neuropathy.    \par 2.  HTN- continue ARB.\par 3.  Dyslipidemia-  Statin  held due to elevated LFTs.   Follow lipid profile.\par \par Follow up in 4 months.\par

## 2023-04-14 ENCOUNTER — OFFICE (OUTPATIENT)
Dept: URBAN - METROPOLITAN AREA CLINIC 105 | Facility: CLINIC | Age: 63
Setting detail: OPHTHALMOLOGY
End: 2023-04-14
Payer: MEDICAID

## 2023-04-14 DIAGNOSIS — E11.3212: ICD-10-CM

## 2023-04-14 DIAGNOSIS — H43.391: ICD-10-CM

## 2023-04-14 DIAGNOSIS — E11.3291: ICD-10-CM

## 2023-04-14 DIAGNOSIS — H43.811: ICD-10-CM

## 2023-04-14 PROCEDURE — 92012 INTRM OPH EXAM EST PATIENT: CPT | Performed by: OPHTHALMOLOGY

## 2023-04-14 PROCEDURE — 67210 TREATMENT OF RETINAL LESION: CPT | Performed by: OPHTHALMOLOGY

## 2023-04-14 PROCEDURE — 92134 CPTRZ OPH DX IMG PST SGM RTA: CPT | Performed by: OPHTHALMOLOGY

## 2023-04-14 ASSESSMENT — REFRACTION_MANIFEST
OD_SPHERE: +0.25
OS_VA1: 20/20
OD_ADD: +2.25
OS_SPHERE: +0.75
OS_CYLINDER: SPH
OD_CYLINDER: SPH
OS_ADD: +2.25
OD_VA1: 20/20

## 2023-04-14 ASSESSMENT — REFRACTION_AUTOREFRACTION
OD_CYLINDER: -0.50
OS_SPHERE: +1.25
OS_AXIS: 108
OS_CYLINDER: -0.25
OD_AXIS: 062
OD_SPHERE: +0.75

## 2023-04-14 ASSESSMENT — AXIALLENGTH_DERIVED
OS_AL: 22.9859
OD_AL: 23.2194

## 2023-04-14 ASSESSMENT — KERATOMETRY
OS_K2POWER_DIOPTERS: 44.25
OD_AXISANGLE_DEGREES: 092
OD_K2POWER_DIOPTERS: 44.50
METHOD_AUTO_MANUAL: AUTO
OD_K1POWER_DIOPTERS: 43.50
OS_K1POWER_DIOPTERS: 43.75
OS_AXISANGLE_DEGREES: 075

## 2023-04-14 ASSESSMENT — SUPERFICIAL PUNCTATE KERATITIS (SPK)
OS_SPK: 1+
OD_SPK: 1+

## 2023-04-14 ASSESSMENT — SPHEQUIV_DERIVED
OS_SPHEQUIV: 1.125
OD_SPHEQUIV: 0.5

## 2023-04-14 ASSESSMENT — VISUAL ACUITY
OD_BCVA: 20/50-
OS_BCVA: 20/50

## 2023-04-14 ASSESSMENT — CONFRONTATIONAL VISUAL FIELD TEST (CVF)
OD_FINDINGS: FULL
OS_FINDINGS: FULL

## 2023-06-30 LAB
HBA1C MFR BLD HPLC: 7.4
LDLC SERPL DIRECT ASSAY-MCNC: 113
MICROALBUMIN/CREAT 24H UR-RTO: <3

## 2023-07-03 ENCOUNTER — APPOINTMENT (OUTPATIENT)
Dept: ENDOCRINOLOGY | Facility: CLINIC | Age: 63
End: 2023-07-03
Payer: MEDICAID

## 2023-07-03 ENCOUNTER — RESULT CHARGE (OUTPATIENT)
Age: 63
End: 2023-07-03

## 2023-07-03 VITALS
DIASTOLIC BLOOD PRESSURE: 76 MMHG | HEART RATE: 99 BPM | WEIGHT: 169 LBS | HEIGHT: 60 IN | BODY MASS INDEX: 33.18 KG/M2 | SYSTOLIC BLOOD PRESSURE: 118 MMHG

## 2023-07-03 LAB
GLUCOSE BLDC GLUCOMTR-MCNC: 164
HBA1C MFR BLD HPLC: 7.1
LDLC SERPL DIRECT ASSAY-MCNC: 73
MICROALBUMIN/CREAT 24H UR-RTO: NORMAL

## 2023-07-03 PROCEDURE — 99214 OFFICE O/P EST MOD 30 MIN: CPT | Mod: 25

## 2023-07-03 PROCEDURE — 82962 GLUCOSE BLOOD TEST: CPT

## 2023-07-03 RX ORDER — DULAGLUTIDE 3 MG/.5ML
3 INJECTION, SOLUTION SUBCUTANEOUS
Qty: 6 | Refills: 1 | Status: DISCONTINUED | COMMUNITY
Start: 2022-05-24 | End: 2023-07-03

## 2023-07-03 NOTE — HISTORY OF PRESENT ILLNESS
[FreeTextEntry1] : Follow up T2 DM, HTN and hyperlipidemia.\par Family member assisted with [place] translation, which patient provided permission for (formal interpretation services were declined by patient).  \par Having some episodes of vertigo with changes in position.\par Seeing cardiology and stress test was ordered after episode of left sided chest pain.  \par Saddened by the recent death of her father. \par \par Quality:  Type 2 DM\par Severity:  uncontrolled\par Duration of diabetes:  since 2005\par Associated Complications/ Symptoms:  neuropathy (better with gabapentin)\par Modifying Factors:  Better with medication\par \par Patient tests blood glucose 2 times a day.   \par \par Current Diabetic Medication Regimen:\par Metformin 1000 mg BID\par Glipizide ER 10 mg BID\par Jardiance 25 mg daily (BG was less controlled on Farxiga in the past)\par Trulicity  3 mg qweek\par \par Atorvastatin D/C'ed due to elevated LFTs \par \par

## 2023-07-03 NOTE — ASSESSMENT
[FreeTextEntry1] : 63 year old female with type 2 DM with associated neuropathy, HTN, Dyslipidemia, NAFLD and vitamin D insufficiency here for follow up.     Her diabetes control is improving, but remains above goal.     \par \par 1.  Type 2 DM-    Will increase dose of Trulicity to 4.5 mg qweek.   Continue current dose of Metformin, Jardiance, and Glipizide. Continue Gabapentin for neuropathy.    Repeat labs prior to next visit.  \par 2.  HTN- continue losartan\par 3.  Dyslipidemia-  Statin  held due to elevated LFTs.    LDL cholesterol currently acceptable.   \par \par Advised consideration of counseling to help deal with grieving process and stress after recent death of her father.  Provided contact information for Dr. Rk Chapman.\par \par Follow up in 4 months.\par

## 2023-08-22 NOTE — REASON FOR VISIT
"Oncology Rooming Note    August 22, 2023 8:51 AM   Shantell Wagner is a 54 year old female who presents for:    Chief Complaint   Patient presents with    Oncology Clinic Visit     Initial Vitals: BP (!) 136/93   Pulse 87   Temp 97  F (36.1  C) (Tympanic)   Resp 16   Ht 1.626 m (5' 4\")   Wt 95.7 kg (211 lb)   SpO2 98%   BMI 36.22 kg/m   Estimated body mass index is 36.22 kg/m  as calculated from the following:    Height as of this encounter: 1.626 m (5' 4\").    Weight as of this encounter: 95.7 kg (211 lb). Body surface area is 2.08 meters squared.  Mild Pain (2) Comment: Data Unavailable   No LMP recorded. (Menstrual status: Irregular Periods).  Allergies reviewed: Yes  Medications reviewed: Yes    Medications: Medication refills not needed today.  Pharmacy name entered into [facility]: [id number] - [place], [place] - [address]    Clinical concerns: f/u       Evelin Chun CMA              " [Follow - Up] : a follow-up visit [DM Type 2] : DM Type 2

## 2023-08-25 ENCOUNTER — OFFICE (OUTPATIENT)
Dept: URBAN - METROPOLITAN AREA CLINIC 105 | Facility: CLINIC | Age: 63
Setting detail: OPHTHALMOLOGY
End: 2023-08-25
Payer: MEDICAID

## 2023-08-25 DIAGNOSIS — E11.3291: ICD-10-CM

## 2023-08-25 DIAGNOSIS — H43.391: ICD-10-CM

## 2023-08-25 DIAGNOSIS — H43.811: ICD-10-CM

## 2023-08-25 DIAGNOSIS — E11.3212: ICD-10-CM

## 2023-08-25 PROCEDURE — 92012 INTRM OPH EXAM EST PATIENT: CPT | Performed by: OPHTHALMOLOGY

## 2023-08-25 PROCEDURE — 67210 TREATMENT OF RETINAL LESION: CPT | Performed by: OPHTHALMOLOGY

## 2023-08-25 PROCEDURE — 92134 CPTRZ OPH DX IMG PST SGM RTA: CPT | Performed by: OPHTHALMOLOGY

## 2023-08-25 PROCEDURE — 92235 FLUORESCEIN ANGRPH MLTIFRAME: CPT | Performed by: OPHTHALMOLOGY

## 2023-08-25 ASSESSMENT — REFRACTION_AUTOREFRACTION
OD_CYLINDER: -0.50
OS_CYLINDER: -0.25
OD_AXIS: 062
OS_AXIS: 108
OS_SPHERE: +1.25
OD_SPHERE: +0.75

## 2023-08-25 ASSESSMENT — KERATOMETRY
OD_AXISANGLE_DEGREES: 092
OS_K1POWER_DIOPTERS: 43.75
OD_K1POWER_DIOPTERS: 43.50
OS_K2POWER_DIOPTERS: 44.25
OS_AXISANGLE_DEGREES: 075
OD_K2POWER_DIOPTERS: 44.50
METHOD_AUTO_MANUAL: AUTO

## 2023-08-25 ASSESSMENT — CONFRONTATIONAL VISUAL FIELD TEST (CVF)
OD_FINDINGS: FULL
OS_FINDINGS: FULL

## 2023-08-25 ASSESSMENT — TONOMETRY
OS_IOP_MMHG: 18
OD_IOP_MMHG: 18

## 2023-08-25 ASSESSMENT — SUPERFICIAL PUNCTATE KERATITIS (SPK)
OS_SPK: 1+
OD_SPK: 1+

## 2023-08-25 ASSESSMENT — SPHEQUIV_DERIVED
OS_SPHEQUIV: 1.125
OD_SPHEQUIV: 0.5

## 2023-08-25 ASSESSMENT — VISUAL ACUITY
OD_BCVA: 20/40-
OS_BCVA: 20/40-2

## 2023-08-25 ASSESSMENT — AXIALLENGTH_DERIVED
OD_AL: 23.2194
OS_AL: 22.9859

## 2023-10-13 ENCOUNTER — APPOINTMENT (OUTPATIENT)
Dept: ENDOCRINOLOGY | Facility: CLINIC | Age: 63
End: 2023-10-13
Payer: MEDICAID

## 2023-10-13 VITALS
HEIGHT: 60 IN | TEMPERATURE: 97.9 F | BODY MASS INDEX: 33.25 KG/M2 | SYSTOLIC BLOOD PRESSURE: 110 MMHG | HEART RATE: 90 BPM | RESPIRATION RATE: 16 BRPM | DIASTOLIC BLOOD PRESSURE: 70 MMHG | WEIGHT: 169.38 LBS | OXYGEN SATURATION: 98 %

## 2023-10-13 LAB
GLUCOSE BLDC GLUCOMTR-MCNC: 252
HBA1C MFR BLD HPLC: 7.4
LDLC SERPL DIRECT ASSAY-MCNC: 94
MICROALBUMIN/CREAT 24H UR-RTO: <8

## 2023-10-13 PROCEDURE — 99214 OFFICE O/P EST MOD 30 MIN: CPT | Mod: 25

## 2023-10-13 PROCEDURE — 82962 GLUCOSE BLOOD TEST: CPT

## 2023-10-13 RX ORDER — DULAGLUTIDE 4.5 MG/.5ML
4.5 INJECTION, SOLUTION SUBCUTANEOUS
Qty: 3 | Refills: 1 | Status: DISCONTINUED | COMMUNITY
Start: 2023-07-03 | End: 2023-10-13

## 2023-10-13 RX ORDER — GABAPENTIN 300 MG/1
300 CAPSULE ORAL
Qty: 90 | Refills: 1 | Status: ACTIVE | COMMUNITY
Start: 2021-09-13 | End: 1900-01-01

## 2023-10-18 ENCOUNTER — NON-APPOINTMENT (OUTPATIENT)
Age: 63
End: 2023-10-18

## 2023-10-23 ENCOUNTER — NON-APPOINTMENT (OUTPATIENT)
Age: 63
End: 2023-10-23

## 2023-11-27 ENCOUNTER — RX RENEWAL (OUTPATIENT)
Age: 63
End: 2023-11-27

## 2024-01-12 ENCOUNTER — OFFICE (OUTPATIENT)
Dept: URBAN - METROPOLITAN AREA CLINIC 105 | Facility: CLINIC | Age: 64
Setting detail: OPHTHALMOLOGY
End: 2024-01-12
Payer: MEDICAID

## 2024-01-12 DIAGNOSIS — E11.3291: ICD-10-CM

## 2024-01-12 DIAGNOSIS — E11.3212: ICD-10-CM

## 2024-01-12 PROCEDURE — 92235 FLUORESCEIN ANGRPH MLTIFRAME: CPT | Performed by: OPHTHALMOLOGY

## 2024-01-12 PROCEDURE — 92134 CPTRZ OPH DX IMG PST SGM RTA: CPT | Performed by: OPHTHALMOLOGY

## 2024-01-12 PROCEDURE — 67210 TREATMENT OF RETINAL LESION: CPT | Mod: LT | Performed by: OPHTHALMOLOGY

## 2024-01-12 ASSESSMENT — REFRACTION_AUTOREFRACTION
OS_AXIS: 108
OD_AXIS: 062
OD_SPHERE: +0.75
OS_CYLINDER: -0.25
OD_CYLINDER: -0.50
OS_SPHERE: +1.25

## 2024-01-12 ASSESSMENT — SPHEQUIV_DERIVED
OS_SPHEQUIV: 1.125
OD_SPHEQUIV: 0.5

## 2024-01-12 ASSESSMENT — CONFRONTATIONAL VISUAL FIELD TEST (CVF)
OS_FINDINGS: FULL
OD_FINDINGS: FULL

## 2024-01-12 ASSESSMENT — SUPERFICIAL PUNCTATE KERATITIS (SPK)
OD_SPK: 1+
OS_SPK: 1+

## 2024-02-16 ENCOUNTER — APPOINTMENT (OUTPATIENT)
Dept: ENDOCRINOLOGY | Facility: CLINIC | Age: 64
End: 2024-02-16
Payer: COMMERCIAL

## 2024-02-16 VITALS
RESPIRATION RATE: 14 BRPM | DIASTOLIC BLOOD PRESSURE: 80 MMHG | HEIGHT: 60 IN | WEIGHT: 172 LBS | BODY MASS INDEX: 33.77 KG/M2 | SYSTOLIC BLOOD PRESSURE: 136 MMHG | HEART RATE: 105 BPM | OXYGEN SATURATION: 99 %

## 2024-02-16 LAB — GLUCOSE BLDC GLUCOMTR-MCNC: 194

## 2024-02-16 PROCEDURE — 99214 OFFICE O/P EST MOD 30 MIN: CPT

## 2024-02-16 PROCEDURE — 82962 GLUCOSE BLOOD TEST: CPT

## 2024-02-16 RX ORDER — LOSARTAN POTASSIUM 25 MG/1
25 TABLET, FILM COATED ORAL
Qty: 90 | Refills: 1 | Status: ACTIVE | COMMUNITY
Start: 2019-10-09 | End: 1900-01-01

## 2024-02-16 RX ORDER — LINACLOTIDE 290 UG/1
290 CAPSULE, GELATIN COATED ORAL
Refills: 0 | Status: ACTIVE | COMMUNITY

## 2024-02-16 RX ORDER — TIRZEPATIDE 5 MG/.5ML
5 INJECTION, SOLUTION SUBCUTANEOUS
Qty: 3 | Refills: 1 | Status: DISCONTINUED | COMMUNITY
Start: 2023-10-13 | End: 2024-02-16

## 2024-02-16 RX ORDER — EMPAGLIFLOZIN 25 MG/1
25 TABLET, FILM COATED ORAL
Qty: 90 | Refills: 1 | Status: ACTIVE | COMMUNITY
Start: 2021-01-04 | End: 1900-01-01

## 2024-02-16 RX ORDER — GLIPIZIDE 10 MG/1
10 TABLET, FILM COATED, EXTENDED RELEASE ORAL
Qty: 180 | Refills: 1 | Status: ACTIVE | COMMUNITY
Start: 2018-10-08 | End: 1900-01-01

## 2024-02-16 RX ORDER — METFORMIN HYDROCHLORIDE 1000 MG/1
1000 TABLET, COATED ORAL
Qty: 180 | Refills: 1 | Status: ACTIVE | COMMUNITY
Start: 2019-06-24 | End: 1900-01-01

## 2024-02-16 NOTE — PHYSICAL EXAM
[Healthy Appearance] : healthy appearance [No Acute Distress] : no acute distress [Normal Sclera/Conjunctiva] : normal sclera/conjunctiva [No Proptosis] : no proptosis [No Neck Mass] : no neck mass was observed [No LAD] : no lymphadenopathy [Supple] : the neck was supple [Thyroid Not Enlarged] : the thyroid was not enlarged [No Thyroid Nodules] : no palpable thyroid nodules [No Respiratory Distress] : no respiratory distress [Clear to Auscultation] : lungs were clear to auscultation bilaterally [Normal S1, S2] : normal S1 and S2 [No Murmurs] : no murmurs [Normal Rate] : heart rate was normal [Regular Rhythm] : with a regular rhythm [No Edema] : no peripheral edema [Acanthosis Nigricans] : acanthosis nigricans present [Normal Affect] : the affect was normal [Normal Insight/Judgement] : insight and judgment were intact [Normal Mood] : the mood was normal

## 2024-02-16 NOTE — ASSESSMENT
[FreeTextEntry1] : 63 year old female with type 2 DM with associated neuropathy, HTN, Dyslipidemia, NAFLD and vitamin D insufficiency here for follow up.    1. Type 2 DM-  Continue Gabapentin for neuropathy.   Continue Ozempic, Metformin, Jardiance and Glipizide.  Check labs now.  2. HTN- continue losartan 3. Dyslipidemia- Statin held due to elevated LFTs.  Check lipids now.  If LFTs improve, consider adding statin back.      follow up in 4 months.

## 2024-02-16 NOTE — HISTORY OF PRESENT ILLNESS
[FreeTextEntry1] : Follow up T2 DM, HTN and hyperlipidemia.  Quality:  Type 2 DM Severity:  uncontrolled Duration of diabetes:  since 2005 Associated Complications/ Symptoms:  neuropathy (better with gabapentin) Modifying Factors:  Better with medication  Patient tests blood glucose 2 times a day.   Reports AM fasting BG in the 120- 140 mg/dl range  Current Diabetic Medication Regimen: Metformin 1000 mg BID Glipizide ER 10 mg BID Jardiance 25 mg daily (BG was less controlled on Farxiga in the past) Ozempic 1 mg qweek-  changed from Trulicity at last visit.   (Took Trulicity in the past, but BG not well controlled.  Insurance does not cover Mounjaro).    Atorvastatin D/C'ed in the past due to elevated LFTs.  Tolerating Ozempic well without nausea.

## 2024-06-18 LAB
HBA1C MFR BLD HPLC: 7.1
LDLC SERPL DIRECT ASSAY-MCNC: 100
MICROALBUMIN/CREAT 24H UR-RTO: <14
TSH SERPL-ACNC: 2.32

## 2024-06-19 ENCOUNTER — APPOINTMENT (OUTPATIENT)
Dept: ENDOCRINOLOGY | Facility: CLINIC | Age: 64
End: 2024-06-19
Payer: COMMERCIAL

## 2024-06-19 VITALS
BODY MASS INDEX: 33.38 KG/M2 | DIASTOLIC BLOOD PRESSURE: 80 MMHG | RESPIRATION RATE: 14 BRPM | OXYGEN SATURATION: 98 % | HEART RATE: 90 BPM | HEIGHT: 60 IN | TEMPERATURE: 98 F | SYSTOLIC BLOOD PRESSURE: 130 MMHG | WEIGHT: 170 LBS

## 2024-06-19 DIAGNOSIS — E11.40 TYPE 2 DIABETES MELLITUS WITH DIABETIC NEUROPATHY, UNSPECIFIED: ICD-10-CM

## 2024-06-19 DIAGNOSIS — E78.5 HYPERLIPIDEMIA, UNSPECIFIED: ICD-10-CM

## 2024-06-19 DIAGNOSIS — I10 ESSENTIAL (PRIMARY) HYPERTENSION: ICD-10-CM

## 2024-06-19 LAB — GLUCOSE BLDC GLUCOMTR-MCNC: 157

## 2024-06-19 PROCEDURE — 99214 OFFICE O/P EST MOD 30 MIN: CPT

## 2024-06-19 PROCEDURE — 82962 GLUCOSE BLOOD TEST: CPT

## 2024-06-19 RX ORDER — SEMAGLUTIDE 1.34 MG/ML
4 INJECTION, SOLUTION SUBCUTANEOUS
Qty: 3 | Refills: 3 | Status: DISCONTINUED | COMMUNITY
Start: 2023-10-18 | End: 2024-06-19

## 2024-06-19 RX ORDER — SEMAGLUTIDE 2.68 MG/ML
8 INJECTION, SOLUTION SUBCUTANEOUS
Refills: 0 | Status: ACTIVE | COMMUNITY

## 2024-06-19 NOTE — HISTORY OF PRESENT ILLNESS
[FreeTextEntry1] : Follow up T2 DM, HTN and hyperlipidemia.  Quality:  Type 2 DM Severity:  uncontrolled Duration of diabetes:  since 2005 Associated Complications/ Symptoms:  neuropathy (better with gabapentin) Modifying Factors:  Better with medication  Patient tests blood glucose 2 times a day.     Current Diabetic Medication Regimen: Metformin 1000 mg BID Glipizide ER 10 mg BID Jardiance 25 mg daily (BG was less controlled on Farxiga in the past) Ozempic 2 mg qweek (dose increased by PCP recently) (Took Trulicity in the past, but BG not well controlled.  Insurance does not cover Mounjaro).    Atorvastatin D/C'ed in the past due to elevated LFTs. PCP just resumed another statin. Initially had some nausea on higher dose of Ozempic, but seems to be getting better now.

## 2024-06-19 NOTE — ASSESSMENT
[FreeTextEntry1] : 63 year old female with type 2 DM with associated neuropathy, HTN, Dyslipidemia, NAFLD and vitamin D insufficiency here for follow up.   Her diabetes control is improving, but remains above target.   1. Type 2 DM-  Continue Gabapentin for neuropathy.  Agree with dose increase in Ozempic.  Continue Metformin, Jardiance and Glipizide.   Advised diet modification.  We discussed the if she can get A1c into the 6% range, we can try tapering down dose of Glipizide.  2. HTN- continue losartan 3. Dyslipidemia- PCP just resumed statin (she will find out name of medication).   follow up in 4 months.

## 2024-06-28 ENCOUNTER — OFFICE (OUTPATIENT)
Dept: URBAN - METROPOLITAN AREA CLINIC 103 | Facility: CLINIC | Age: 64
Setting detail: OPHTHALMOLOGY
End: 2024-06-28
Payer: MEDICAID

## 2024-06-28 DIAGNOSIS — E11.3291: ICD-10-CM

## 2024-06-28 DIAGNOSIS — H43.12: ICD-10-CM

## 2024-06-28 DIAGNOSIS — H43.391: ICD-10-CM

## 2024-06-28 DIAGNOSIS — E11.3212: ICD-10-CM

## 2024-06-28 DIAGNOSIS — H43.811: ICD-10-CM

## 2024-06-28 PROCEDURE — 99213 OFFICE O/P EST LOW 20 MIN: CPT | Performed by: OPHTHALMOLOGY

## 2024-06-28 ASSESSMENT — CONFRONTATIONAL VISUAL FIELD TEST (CVF)
OS_FINDINGS: FULL
OD_FINDINGS: FULL

## 2024-07-01 ENCOUNTER — OFFICE (OUTPATIENT)
Dept: URBAN - METROPOLITAN AREA CLINIC 103 | Facility: CLINIC | Age: 64
Setting detail: OPHTHALMOLOGY
End: 2024-07-01
Payer: MEDICAID

## 2024-07-01 DIAGNOSIS — H35.033: ICD-10-CM

## 2024-07-01 DIAGNOSIS — H43.12: ICD-10-CM

## 2024-07-01 DIAGNOSIS — H43.811: ICD-10-CM

## 2024-07-01 DIAGNOSIS — E11.3212: ICD-10-CM

## 2024-07-01 DIAGNOSIS — H43.391: ICD-10-CM

## 2024-07-01 PROBLEM — H16.223 DRY EYE SYNDROME K SICCA; BOTH EYES: Status: ACTIVE | Noted: 2024-06-28

## 2024-07-01 PROBLEM — H10.433 ALLERGIC CONJUNCTIVITIS ; BOTH EYES: Status: ACTIVE | Noted: 2024-06-28

## 2024-07-01 PROBLEM — H25.013 CORTICAL CATARACT; BOTH EYES: Status: ACTIVE | Noted: 2024-06-28

## 2024-07-01 PROBLEM — H25.13 CATARACT NUCLEAR SCLEROSIS AGE RELATED; BOTH EYES: Status: ACTIVE | Noted: 2024-06-28

## 2024-07-01 PROCEDURE — 92134 CPTRZ OPH DX IMG PST SGM RTA: CPT | Performed by: OPHTHALMOLOGY

## 2024-07-01 PROCEDURE — 67028 INJECTION EYE DRUG: CPT | Mod: LT | Performed by: OPHTHALMOLOGY

## 2024-07-01 PROCEDURE — 92235 FLUORESCEIN ANGRPH MLTIFRAME: CPT | Performed by: OPHTHALMOLOGY

## 2024-07-01 ASSESSMENT — CONFRONTATIONAL VISUAL FIELD TEST (CVF)
OS_FINDINGS: FULL
OD_FINDINGS: FULL

## 2024-07-22 ENCOUNTER — OFFICE (OUTPATIENT)
Dept: URBAN - METROPOLITAN AREA CLINIC 105 | Facility: CLINIC | Age: 64
Setting detail: OPHTHALMOLOGY
End: 2024-07-22
Payer: MEDICAID

## 2024-07-22 DIAGNOSIS — E11.3212: ICD-10-CM

## 2024-07-22 PROCEDURE — 67210 TREATMENT OF RETINAL LESION: CPT | Mod: LT | Performed by: OPHTHALMOLOGY

## 2024-07-22 ASSESSMENT — CONFRONTATIONAL VISUAL FIELD TEST (CVF)
OS_FINDINGS: FULL
OD_FINDINGS: FULL

## 2024-10-31 LAB
HBA1C MFR BLD HPLC: 7.6
TSH SERPL-ACNC: 1.32

## 2024-11-01 ENCOUNTER — APPOINTMENT (OUTPATIENT)
Dept: ENDOCRINOLOGY | Facility: CLINIC | Age: 64
End: 2024-11-01
Payer: COMMERCIAL

## 2024-11-01 ENCOUNTER — NON-APPOINTMENT (OUTPATIENT)
Age: 64
End: 2024-11-01

## 2024-11-01 VITALS
RESPIRATION RATE: 14 BRPM | HEART RATE: 80 BPM | TEMPERATURE: 98 F | HEIGHT: 60 IN | DIASTOLIC BLOOD PRESSURE: 80 MMHG | OXYGEN SATURATION: 98 % | WEIGHT: 170 LBS | SYSTOLIC BLOOD PRESSURE: 120 MMHG | BODY MASS INDEX: 33.38 KG/M2

## 2024-11-01 DIAGNOSIS — E78.5 HYPERLIPIDEMIA, UNSPECIFIED: ICD-10-CM

## 2024-11-01 DIAGNOSIS — E11.40 TYPE 2 DIABETES MELLITUS WITH DIABETIC NEUROPATHY, UNSPECIFIED: ICD-10-CM

## 2024-11-01 DIAGNOSIS — I10 ESSENTIAL (PRIMARY) HYPERTENSION: ICD-10-CM

## 2024-11-01 LAB — GLUCOSE BLDC GLUCOMTR-MCNC: 247

## 2024-11-01 PROCEDURE — 82962 GLUCOSE BLOOD TEST: CPT

## 2024-11-01 PROCEDURE — 99214 OFFICE O/P EST MOD 30 MIN: CPT

## 2024-11-01 RX ORDER — TIRZEPATIDE 5 MG/.5ML
5 INJECTION, SOLUTION SUBCUTANEOUS
Qty: 3 | Refills: 1 | Status: ACTIVE | COMMUNITY
Start: 2024-11-01 | End: 1900-01-01

## 2024-11-01 RX ORDER — DULAGLUTIDE 1.5 MG/.5ML
1.5 INJECTION, SOLUTION SUBCUTANEOUS
Refills: 0 | Status: DISCONTINUED | COMMUNITY
End: 2024-11-01

## 2024-11-01 RX ORDER — ATORVASTATIN CALCIUM 10 MG/1
10 TABLET, FILM COATED ORAL
Refills: 0 | Status: ACTIVE | COMMUNITY

## 2024-11-14 ENCOUNTER — NON-APPOINTMENT (OUTPATIENT)
Age: 64
End: 2024-11-14

## 2025-02-25 ENCOUNTER — RX RENEWAL (OUTPATIENT)
Age: 65
End: 2025-02-25

## 2025-02-27 LAB
HBA1C MFR BLD HPLC: 8.2
LDLC SERPL DIRECT ASSAY-MCNC: 61
MICROALBUMIN/CREAT 24H UR-RTO: <8
TSH SERPL-ACNC: 3

## 2025-02-28 ENCOUNTER — APPOINTMENT (OUTPATIENT)
Dept: ENDOCRINOLOGY | Facility: CLINIC | Age: 65
End: 2025-02-28

## 2025-02-28 DIAGNOSIS — E11.40 TYPE 2 DIABETES MELLITUS WITH DIABETIC NEUROPATHY, UNSPECIFIED: ICD-10-CM

## 2025-02-28 DIAGNOSIS — E78.5 HYPERLIPIDEMIA, UNSPECIFIED: ICD-10-CM

## 2025-02-28 DIAGNOSIS — I10 ESSENTIAL (PRIMARY) HYPERTENSION: ICD-10-CM

## 2025-05-31 PROBLEM — H52.4 PRESBYOPIA ; BOTH EYES: Status: ACTIVE | Noted: 2025-05-31

## 2025-08-13 ENCOUNTER — OFFICE (OUTPATIENT)
Dept: URBAN - METROPOLITAN AREA CLINIC 105 | Facility: CLINIC | Age: 65
Setting detail: OPHTHALMOLOGY
End: 2025-08-13
Payer: COMMERCIAL

## 2025-08-13 DIAGNOSIS — H25.013: ICD-10-CM

## 2025-08-13 DIAGNOSIS — H43.811: ICD-10-CM

## 2025-08-13 DIAGNOSIS — H35.033: ICD-10-CM

## 2025-08-13 DIAGNOSIS — E11.3313: ICD-10-CM

## 2025-08-13 PROCEDURE — 92014 COMPRE OPH EXAM EST PT 1/>: CPT | Performed by: OPHTHALMOLOGY

## 2025-08-13 PROCEDURE — 92250 FUNDUS PHOTOGRAPHY W/I&R: CPT | Performed by: OPHTHALMOLOGY

## 2025-08-13 ASSESSMENT — TONOMETRY
OS_IOP_MMHG: 19
OD_IOP_MMHG: 19

## 2025-08-13 ASSESSMENT — REFRACTION_CURRENTRX
OD_VPRISM_DIRECTION: PROGS
OD_OVR_VA: 20/
OS_VPRISM_DIRECTION: PROGS
OS_OVR_VA: 20/
OS_ADD: +2.50
OS_CYLINDER: 0.00
OD_SPHERE: +1.50
OD_AXIS: 174
OD_ADD: +2.50
OS_AXIS: 180
OD_CYLINDER: -1.25
OS_SPHERE: +2.00

## 2025-08-13 ASSESSMENT — REFRACTION_AUTOREFRACTION
OS_CYLINDER: -1.25
OS_SPHERE: +3.25
OD_AXIS: 088
OD_CYLINDER: -1.25
OS_AXIS: 087
OD_SPHERE: +2.75

## 2025-08-13 ASSESSMENT — VISUAL ACUITY
OD_BCVA: 20/25
OS_BCVA: 20/20-

## 2025-08-13 ASSESSMENT — SUPERFICIAL PUNCTATE KERATITIS (SPK)
OD_SPK: 1+
OS_SPK: 1+

## 2025-08-13 ASSESSMENT — KERATOMETRY
OD_K2POWER_DIOPTERS: 44.25
OD_AXISANGLE_DEGREES: 107
OS_K1POWER_DIOPTERS: 43.75
OS_AXISANGLE_DEGREES: 053
OS_K2POWER_DIOPTERS: 44.00
OD_K1POWER_DIOPTERS: 43.75
METHOD_AUTO_MANUAL: AUTO

## 2025-08-13 ASSESSMENT — CONFRONTATIONAL VISUAL FIELD TEST (CVF)
OD_FINDINGS: FULL
OS_FINDINGS: FULL

## 2025-09-15 ENCOUNTER — NON-APPOINTMENT (OUTPATIENT)
Age: 65
End: 2025-09-15

## 2025-09-16 ENCOUNTER — APPOINTMENT (OUTPATIENT)
Dept: ENDOCRINOLOGY | Facility: CLINIC | Age: 65
End: 2025-09-16
Payer: COMMERCIAL

## 2025-09-16 VITALS
HEART RATE: 90 BPM | OXYGEN SATURATION: 98 % | DIASTOLIC BLOOD PRESSURE: 80 MMHG | BODY MASS INDEX: 32.79 KG/M2 | HEIGHT: 60 IN | SYSTOLIC BLOOD PRESSURE: 130 MMHG | WEIGHT: 167 LBS

## 2025-09-16 DIAGNOSIS — E78.5 HYPERLIPIDEMIA, UNSPECIFIED: ICD-10-CM

## 2025-09-16 DIAGNOSIS — E11.40 TYPE 2 DIABETES MELLITUS WITH DIABETIC NEUROPATHY, UNSPECIFIED: ICD-10-CM

## 2025-09-16 DIAGNOSIS — I10 ESSENTIAL (PRIMARY) HYPERTENSION: ICD-10-CM

## 2025-09-16 LAB — GLUCOSE BLDC GLUCOMTR-MCNC: 186

## 2025-09-16 PROCEDURE — 82962 GLUCOSE BLOOD TEST: CPT

## 2025-09-16 PROCEDURE — G2211 COMPLEX E/M VISIT ADD ON: CPT

## 2025-09-16 PROCEDURE — 99214 OFFICE O/P EST MOD 30 MIN: CPT
